# Patient Record
Sex: FEMALE | Race: WHITE | NOT HISPANIC OR LATINO | Employment: FULL TIME | URBAN - METROPOLITAN AREA
[De-identification: names, ages, dates, MRNs, and addresses within clinical notes are randomized per-mention and may not be internally consistent; named-entity substitution may affect disease eponyms.]

---

## 2022-03-28 ENCOUNTER — HOSPITAL ENCOUNTER (EMERGENCY)
Facility: CLINIC | Age: 27
Discharge: HOME | End: 2022-03-28
Attending: EMERGENCY MEDICINE | Admitting: EMERGENCY MEDICINE
Payer: COMMERCIAL

## 2022-03-28 VITALS
OXYGEN SATURATION: 98 % | HEART RATE: 107 BPM | TEMPERATURE: 98.4 F | RESPIRATION RATE: 16 BRPM | DIASTOLIC BLOOD PRESSURE: 86 MMHG | SYSTOLIC BLOOD PRESSURE: 140 MMHG

## 2022-03-28 DIAGNOSIS — F39 MOOD DISORDER (H): ICD-10-CM

## 2022-03-28 PROCEDURE — 99285 EMERGENCY DEPT VISIT HI MDM: CPT | Mod: 25 | Performed by: EMERGENCY MEDICINE

## 2022-03-28 PROCEDURE — 99283 EMERGENCY DEPT VISIT LOW MDM: CPT | Performed by: EMERGENCY MEDICINE

## 2022-03-28 PROCEDURE — 90791 PSYCH DIAGNOSTIC EVALUATION: CPT

## 2022-03-28 RX ORDER — FLUOXETINE 40 MG/1
40 CAPSULE ORAL DAILY
COMMUNITY

## 2022-03-28 NOTE — ED TRIAGE NOTES
"Pt attempting SIB about 2000 on L wrist last evening. \"I felt numb\". Several small lacerations   "

## 2022-03-28 NOTE — DISCHARGE INSTRUCTIONS
Appointment information and/or additional resources available to me:  You were given information on numerous medication management providers within your local area and insurance network. Please reach out to them if you feel you need specialized care for your psychotropic medication. You were also given information on Gene Sight testing in the information below.    Aftercare Plan    If I am feeling unsafe or I am in a crisis, I will:   Contact my established care providers   Call the National Suicide Prevention Lifeline: 921.372.4518   Go to the nearest emergency room   Call [phone]     Warning signs that I or other people might notice when a crisis is developing for me: any thoughts to harm myself or others    Things I am able to do on my own to cope or help me feel better: reading     Changes I can make to support my mental health and wellness: please speak with your primary care provider about any required medication changes    People in my life that I can ask for help: john Schneider, primary care provider, and therapist    [facility] has a mental health crisis team you can call 24/7: [facility] Mobile Crisis  [phone]    Other things that are important when I m in crisis: please contact your primary care provider or therapist via phone, or the crisis phone numbers below.    What is the GeneSight test?    GeneSight Psychotropic is a pharmacogenomic test which means that it analyzes how your genes may affect medication outcomes. The GeneSight test analyzes clinically important genetic variations in your DNA. Results can inform your doctor about how you may break down or respond to certain medications commonly prescribed to treat depression, anxiety, ADHD, and other psychiatric conditions.    The GeneSight test must be ordered by your doctor or nurse practitioner. The test is a simple cheek swab taken in your healthcare provider s office or can be sent by your doctor to be taken in the convenience of your home.      Things I can use or do for distraction: Reduce Extreme Emotion  QUICKLY:  Changing Your Body Chemistry      T:  Change your body Temperature to change your autonomic nervous system   ? Use Ice Water to calm yourself down FAST   ? Put your face in a bowl of ice water (this is the best way; have the person keep his/her face in ice water for 30-45 seconds - initial research is showing that the longer s/he can hold her/his face in the water, the better the response), or   ? Splash ice water on your face, or hold an ice pack on your face      I:  Intensely exercise to calm down a body revved up by emotion   ? Examples: running, walking fast, jumping, playing basketball, weight lifting, swimming, calisthenics, etc.   ? Engage in exercises that DO NOT include violent behaviors. Exercises that utilize violent behaviors tend to function as  behavioral rehearsal,  and rather than calming the person down, may actually  rev  the person up more, increasing the likelihood of violence, and lessening the likelihood that they will  burn off  energy     P:  Progressively relax your muscles   ? Starting with your hands, moving to your forearms, upper arms, shoulders, neck, forehead, eyes, cheeks and lips, tongue and teeth, chest, upper back, stomach, buttocks, thighs, calves, ankles, feet   ? Tense (10 seconds,   of the way), then relax each muscle (all the way)   ? Notice the tension   ? Notice the difference when relaxed (by tensing first, and then relaxing, you are able to get a more thorough relaxation than by simply relaxing)     P: Paced breathing to relax   ? The standard technique is to begin with counting the number of steps one takes for a typical inhale, then counting the steps one takes for a typical exhale, and then lengthening the amount of steps for the exhalation by one or two steps.  OR  ? Repeat this pattern for 1-2 minutes  ? Inhale for four (4) seconds   ? Exhale for six (6) to eight (8) seconds   ? Research  "demonstrated that one can change one's overall level of anxiety by doing this exercise for even a few minutes per day       Crisis Lines  Crisis Text Line  Text 526653  You will be connected with a trained live crisis counselor to provide support.    Max vale, binduo  MARY a 507853 o texto a 442-AYUDAME en WhatsApp    National Hope Line  1.800.SUICIDE [2608756]      Community Resources  Fast Tracker  Linking people to mental health and substance use disorder resources  [employer] [place]usra     [place] [facility] Warm Line  Peer to peer support  Monday thru Saturday, 12 pm to 10 pm  860.563.6258 or 4.735.867.2116  Text \"Support\" to 27188    National [place] [employer]ness (KATHI)  609.381.1199 or 1.888.KATHI.HELPS        Mental Health Apps  My3  https://[employer].org/    VirtualHopeBox  https://[employer]/apps/virtual-hope-box/      Additional Information  Today you were seen by a licensed mental health professional through Triage and Transition services, [facility] ([facility])  for a crisis assessment in the Emergency Department at [facility].  It is recommended that you follow up with your established providers (psychiatrist, mental health therapist, and/or primary care doctor - as relevant) as soon as possible. Coordinators from [facility] will be calling you in the next 24-48 hours to ensure that you have the resources you need.  You can also contact [facility] coordinators directly at [phone]. You may have been scheduled for or offered an appointment with a mental health provider. [facility] maintains an extensive network of licensed behavioral health providers to connect patients with the services they need.  We do not charge providers a fee to participate in our referral network.  We match patients with providers based on a patient's specific needs, insurance coverage, and location.  Our first effort will be to refer you to a provider within your care system, and will utilize " providers outside your care system as needed.

## 2022-03-28 NOTE — ED NOTES
"3/28/2022  Ericka Thomson 1995     [facility] Crisis Assessment    Patient was assessed: in person  Patient location: [facility] Adult ED    Referral Data and Chief Complaint  Patient is a 26 year old female who presents with her fiance Wyatt for the following concerns: self-injury.      Informed Consent and Assessment Methods    Patient is her own guardian. Writer met with patient and explained the crisis assessment process, including applicable information disclosures and limits to confidentiality, assessed understanding of the process, and obtained consent to proceed with the assessment. Patient was observed to be able to participate in the assessment as evidenced by verbal consent. Assessment methods included conducting a formal interview with patient, review of medical records, collaboration with medical staff, and obtaining relevant collateral information from family and community providers when available.    Narrative Summary of Presenting Problem and Current Functioning  What led to the patient presenting for crisis services, factors that make the crisis life threatening or complex, stressors, how is this disrupting the patient's life, and how current functioning is in comparison to baseline. How is patient presenting during the assessment.     Patient reports not feeling good last night after a \"stupid argument\" with her fiancé that \"spiraled\".  Patient reports she has not felt like her normal self for the past week and a half, since her Prozac was doubled from 20 mg to 40 mg.  Patient reports she now feels \"numb and out of it\".  Patient denies feeling angry, sad, or anything at all.  Patient denies any thoughts to kill herself, but reports she wanted to feel something following this argument last night.  Patient reports she did not draw blood, but attempted to cut her wrist.  Patient reports her fiancé found her in the bathroom and took away her sharps.  Patient reports the pair both took the day off " "today, patient reached out to her primary care clinic in case.  Patient's primary care clinic recommended further evaluation in our emergency department.    History of the Crisis  Duration of the current crisis, coping skills attempted to reduce the crisis, community resources used, and past presentations.    Patient has no history of higher level mental health care, neither inpatient nor intensive outpatient.  Patient has no history of chemical dependency treatment.  Patient has an established individual therapist whom she sees once per month.  Patient reports she had to cancel last appointment due to scheduling conflict.  Patient attempted to schedule another appointment this morning, reports the scheduling website was down.  Patient has a primary care provider who prescribes her psychotropic medication.  Patient was started on Prozac approximately 1.5 months ago.  Patient's dosage was doubled approximately 1.5 weeks ago, from 20 mg to 40 mg currently.    Collateral Information  Per patient's fiancé Wyatt was present in our emergency department: Patient and her fiancé had a \"senseless\" argument yesterday, which is uncommon for them.  Patient's fiancé reports finding 1 of patient's coworkers \"bothersome\".  Patient's fiancé reports this is a \"jealous thing\".  Patient then worries her fiancé will leave her.  Patient's fiancé tried to calm her, de-escalate the situation.  Patient continued crying heavily, was found in their shared bathroom expressing thoughts to cut herself.  Patient and her fiancé both took the day off today and patient contacted her primary care doctor who recommended further evaluation in our emergency department.  Patient's fiancé reports he spent the whole day with her, felt she was safe prior to arrival as well as currently.  Patient's fiancé reports concern with her high medication dosage which helps with \"parts of things, but not everything\".    Per electronic medical record from family " medicine clinic RN on today, 03/28/2022 at 2:19 PM: Pt reports that she tried cutting her wrist with a knife last night after an escalated argument; she did not break the skin or draw blood. She became afraid as she cut. This was the worst her mental health has ever been. She talked it through with her fiance which helped, he is a good support to her.     She is feeling better today, no plans for self-harm today. She feels safe. Last visit with PCP to discuss her depression on 3/11/2022. Taking fluoxetine 40 mg capsule. Pt is speaking in full sentences and is logical in thoughts and responses, no audible distress.    Advised pt to report now to [facility] ED, reasons why discussed. Advised if she feels unsafe en route, she should pull over and call 911. Pt in agreement, and she feels safe to drive now. Problem list reviewed as related to this call.       Risk Assessment    Risk of Harm to Self     ESS-6  1.a. Over the past 2 weeks, have you had thoughts of killing yourself? No  1.b. Have you ever attempted to kill yourself and, if yes, when did this last happen? No   2. Recent or current suicide plan? No   3. Recent or current intent to act on ideation? No  4. Lifetime psychiatric hospitalization? No  5. Pattern of excessive substance use? No  6. Current irritability, agitation, or aggression? No  Scoring note: BOTH 1a and 1b must be yes for it to score 1 point, if both are not yes it is zero. All others are 1 point per number. If all questions 1a/1b - 6 are no, risk is negligible. If one of 1a/1b is yes, then risk is mild. If either question 2 or 3, but not both, is yes, then risk is automatically moderate regardless of total score. If both 2 and 3 are yes, risk is automatically high regardless of total score.     Score: 0, negligible risk    The patient has the following risk factors for suicide: depressive symptoms    Is the patient experiencing current suicidal ideation: No    Is the patient engaging in  preparatory suicide behaviors (formulating how to act on plan, giving away possessions, saying goodbye, displaying dramatic behavior changes, etc)? No    Does the patient have access to firearms or other lethal means? No    The patient has the following protective factors: established relationship community mental health provider(s), sense of obligation to people/pets, safe/stable housing, engagement in school and fulfilling employment    Support system information: fiance, therapist, and primary care provider    Does the patient engage in non-suicidal self-injurious behavior (NSSI/SIB)? Patient attempted minor self-injury last night around 8pm on her left wrist, has several small and superficial markings. Patient reports doing so because she felt numb.    Is the patient vulnerable to sexual exploitation?  No    Is the patient experiencing abuse or neglect? No    Is the patient a vulnerable adult? No      Risk of Harm to Others  The patient has the following risk factors of harm to others: no risk factors identified    Does the patient have thoughts of harming others? No    Is the patient engaging in sexually inappropriate behavior?  No      Current Substance Abuse    Is there recent substance abuse? No    Was a urine drug screen or blood alcohol level obtained: No       Current Symptoms/Concerns    Symptoms  Attention, hyperactivity, and impulsivity symptoms present: No    Anxiety symptoms present: Yes: Generalized Symptoms: Cognitive anxiety - feelings of doom, racing thoughts, difficulty concentrating  and Excessive worry      Appetite symptoms present: No     Behavioral difficulties present: Yes: Apathy     Cognitive impairment symptoms present: Yes: Decision-Making and Judgment/Insight    Depressive symptoms present: Yes Crying or feels like crying, Depressed mood and Feelings of helplessness      Eating disorder symptoms present: No    Learning disabilities, cognitive challenges, and/or developmental disorder  symptoms present: No     Manic/hypomanic symptoms present: No    Personality and interpersonal functioning difficulties present : No    Psychosis symptoms present: No      Sleep difficulties present: Yes: Difficulty falling asleep  and Difficulty staying sleep     Substance abuse disorder symptoms present: No     Trauma and stressor related symptoms present: No       Mental Status Exam   Affect: Appropriate   Appearance: Appropriate    Attention Span/Concentration: Attentive?    Eye Contact: Engaged   Fund of Knowledge: Appropriate    Language /Speech Content: Fluent   Language /Speech Volume: Normal    Language /Speech Rate/Productions: Normal    Recent Memory: Intact   Remote Memory: Intact   Mood: Anxious and Depressed    Orientation to Person: Yes    Orientation to Place: Yes   Orientation to Time of Day: Yes    Orientation to Date: Yes    Situation (Do they understand why they are here?): Yes    Psychomotor Behavior: Normal    Thought Content: Clear   Thought Form: Intact       Mental Health and Substance Abuse History    History  Current and historical diagnoses or mental health concerns: Patient carries major depression diagnosis.    Prior MH services (inpatient, programmatic care, outpatient, etc) : Yes history of individual therapy and medication management.    Has the patient used [facility] crisis team services before?: No    History of substance abuse: No    Prior TRAVIS services (inpatient, programmatic care, detox, outpatient, etc) : No    History of commitment: No    Family history of MH/TRAVIS: None reported.    Trauma history: None reported.    Medication  Psychotropic medications:   No current facility-administered medications for this encounter.     Current Outpatient Medications   Medication     FLUoxetine (PROZAC) 40 MG capsule       Current Care Team  Primary Care Provider: Dr. Mk [facility] [address] [place] [address] [phone]      Therapist: Taylor RODRIGUEZ  Chari, PhD, LP [facility] [address], [place] [address] [phone]    Psychiatrist: No    : No    CTSS or ARMHS: No    ACT Team: No    Other: No    Release of Information  Was a release of information signed: Yes. Providers included on the release: providres listed above.      Biopsychosocial Information    Socioeconomic Information  Current living situation: Patient lives in an apartment with her fiance and dog.    Employment/income source: Patient is a 9th grade . Patient has her masters in education from [employer], with hope to return for her masters in fine arts. Patient has goal of becoming a .    Relevant legal issues: No    Cultural, Moravian, or spiritual influences on mental health care: No    Is the patient active in the  or a : No      Relevant Medical Concerns   Patient identifies concerns with completing ADLs? No     Patient can ambulate independently? Yes     Other medical concerns? No     History of concussion or TBI? No        Diagnosis      1 Major depressive disorder, Recurrent episode, Unspecified F33.9 (by history)          Therapeutic Intervention  The following therapeutic methodologies were employed when working with the patient: establishing rapport, active listening, assessing dimensions of crisis, solution focused brief therapy, identifying additional supports and alternative coping skills, establishing a discharge plan, safety planning, psychoeducation, motivational interviewing, brief supportive therapy, trauma informed care, DBT skills, treatment planning and harm reduction. Patient response to intervention: engaged fully.      Disposition  Recommended disposition: Individual Therapy and Medication Management      Reviewed case and recommendations with attending provider. Attending Name: Dr. Oscar Villagran      Attending concurs with disposition: Yes      Patient concurs with disposition: Yes       Final disposition: Patient to continue with established therapist and primary care provider for medication. Patient was given list of medication management providers should she want specialized care. Patient was also given information on GeneSight testing should she choose to pursue this.    Clinical Substantiation of Recommendations   Rationale with supporting factors for disposition and diagnosis.     Patient cites primary stressor(s) as verbal conflict with fiance yesterday. Patient endorses self-injurious behavior including scratching and thoughts to cut. Patient did not actually draw blood, and did not require any medical attention for her superficial markings. Patient  denies suicidal ideation, intent, and plan. Patient  denies homicidal ideation, intent, and plan. Patient  denies symptoms of psychosis. Patient does not appear to be responding to internal stimuli. Patient denies substance use.         Assessment Details  Patient interview started at: 5:30pm and completed at: 6:00pm.    Total duration spent on the patient case in minutes: .50 hrs     CPT code(s) utilized: 39542 - Psychotherapy for Crisis - 60 (30-74*) min       Aftercare and Safety Planning  Follow up plans with MH/TRAVIS services: Patient to follow up with established primary care and therapist. Patient declined scheduling medication management provider, but was accepting of resources should she choose to pursue specialized psychiatric care in the future.     Aftercare plan placed in the AVS and provided to patient: Yes. Given to patient by nursing staff.    CHASE Vogt      Appointment information and/or additional resources available to me:  You were given information on numerous medication management providers within your local area and insurance network. Please reach out to them if you feel you need specialized care for your psychotropic medication. You were also given information on Gene Sight testing in the information  below.  Aftercare Plan    If I am feeling unsafe or I am in a crisis, I will:   Contact my established care providers   Call the National Suicide Prevention Lifeline: 900.664.7242   Go to the nearest emergency room   Call 911     Warning signs that I or other people might notice when a crisis is developing for me: any thoughts to harm myself or others    Things I am able to do on my own to cope or help me feel better: reading     Changes I can make to support my mental health and wellness: please speak with your primary care provider about any required medication changes    People in my life that I can ask for help: john Schneider, primary care provider, and therapist    [facility] has a mental health crisis team you can call 24/7: [facility] Mobile Crisis  [phone]    Other things that are important when I m in crisis: please contact your primary care provider or therapist via phone, or the crisis phone numbers below.    What is the GeneSight test?    GeneSight Psychotropic is a pharmacogenomic test which means that it analyzes how your genes may affect medication outcomes. The GeneSight test analyzes clinically important genetic variations in your DNA. Results can inform your doctor about how you may break down or respond to certain medications commonly prescribed to treat depression, anxiety, ADHD, and other psychiatric conditions.    The GeneSight test must be ordered by your doctor or nurse practitioner. The test is a simple cheek swab taken in your healthcare provider s office or can be sent by your doctor to be taken in the convenience of your home.     Things I can use or do for distraction: Reduce Extreme Emotion  QUICKLY:  Changing Your Body Chemistry      T:  Change your body Temperature to change your autonomic nervous system   ? Use Ice Water to calm yourself down FAST   ? Put your face in a bowl of ice water (this is the best way; have the person keep his/her face in ice water for 30-45 seconds -  initial research is showing that the longer s/he can hold her/his face in the water, the better the response), or   ? Splash ice water on your face, or hold an ice pack on your face      I:  Intensely exercise to calm down a body revved up by emotion   ? Examples: running, walking fast, jumping, playing basketball, weight lifting, swimming, calisthenics, etc.   ? Engage in exercises that DO NOT include violent behaviors. Exercises that utilize violent behaviors tend to function as  behavioral rehearsal,  and rather than calming the person down, may actually  rev  the person up more, increasing the likelihood of violence, and lessening the likelihood that they will  burn off  energy     P:  Progressively relax your muscles   ? Starting with your hands, moving to your forearms, upper arms, shoulders, neck, forehead, eyes, cheeks and lips, tongue and teeth, chest, upper back, stomach, buttocks, thighs, calves, ankles, feet   ? Tense (10 seconds,   of the way), then relax each muscle (all the way)   ? Notice the tension   ? Notice the difference when relaxed (by tensing first, and then relaxing, you are able to get a more thorough relaxation than by simply relaxing)     P: Paced breathing to relax   ? The standard technique is to begin with counting the number of steps one takes for a typical inhale, then counting the steps one takes for a typical exhale, and then lengthening the amount of steps for the exhalation by one or two steps.  OR  ? Repeat this pattern for 1-2 minutes  ? Inhale for four (4) seconds   ? Exhale for six (6) to eight (8) seconds   ? Research demonstrated that one can change one's overall level of anxiety by doing this exercise for even a few minutes per day       Crisis Lines  Crisis Text Line  Text 491948  You will be connected with a trained live crisis counselor to provide support.    Por espanol, texto  MARY a 616365 o texto a 442-AYUDAME en [facility] Line  1.800.SUICIDE  "[1577118]      Community Resources  Fast Tracker  Linking people to mental health and substance use disorder resources  InstagramckBragThis.comn.org     [place] Mental Health Warm Line  Peer to peer support  Monday thru Saturday, 12 pm to 10 pm  022.088.8711 or 1.015.067.8213  Text \"Support\" to 75831    National [place] on Mental Illness (KATHI)  606.543.0652 or 1.888.KATHI.HELPS        Mental Health Apps  My3  https://Qwilr.org/    VirtualHopeBox  https://[employer]/apps/virtual-hope-box/      Additional Information  Today you were seen by a licensed mental health professional through Triage and Transition services, [facility] (P)  for a crisis assessment in the Emergency Department at [facility].  It is recommended that you follow up with your established providers (psychiatrist, mental health therapist, and/or primary care doctor - as relevant) as soon as possible. Coordinators from [facility] will be calling you in the next 24-48 hours to ensure that you have the resources you need.  You can also contact [facility] coordinators directly at [phone]. You may have been scheduled for or offered an appointment with a mental health provider. [facility] maintains an extensive network of licensed behavioral health providers to connect patients with the services they need.  We do not charge providers a fee to participate in our referral network.  We match patients with providers based on a patient's specific needs, insurance coverage, and location.  Our first effort will be to refer you to a provider within your care system, and will utilize providers outside your care system as needed.    "

## 2022-03-28 NOTE — ED PROVIDER NOTES
[facility] EMERGENCY DEPARTMENT ([facility])       3/28/22  History     Chief Complaint   Patient presents with     Self Injury     HPI  Ericka Thomson is a 26 year old female with a past medical history significant for depression who presents to the Emergency Department for evaluation of self injurious behavior.    Patient reports that she has been prescribed Prozac 40 mg for a while and endorses that she does not believe that this medication is right for her.  She states that yesterday she attempted harming herself on her left forearm in the form of cutting.  She says that she has several small cuts on her left forearm but denies any deep cuts.  She adds that yesterday was the first time in the last 2 months that she had self injures thoughts.  She notes that she has past history of cutting herself.  Patient was in an argument last evening with fiancé that triggered the event. Patient denies suicide attempts in the past.  Patient denies history of admission to the [facility] for mental health.  Patient reports that she is currently on birth control.  Patient's last tetanus was 09/25/2018.    No past medical history on file.    No past surgical history on file.    No family history on file.    Social History     Tobacco Use     Smoking status: Not on file     Smokeless tobacco: Not on file   Substance Use Topics     Alcohol use: Not on file       No current facility-administered medications for this encounter.     Current Outpatient Medications   Medication     FLUoxetine (PROZAC) 40 MG capsule      No Known Allergies     I have reviewed the Medications, Allergies, Past Medical and Surgical History, and Social History in the Epic system.    Review of Systems  A complete review of systems was performed with pertinent positives and negatives noted in the HPI, and all other systems negative.    Physical Exam   BP: (!) 140/86  Pulse: 107  Temp: 98.4  F (36.9  C)  Resp: 16  SpO2: 98 %      Physical  Exam  Constitutional:       General: She is not in acute distress.     Appearance: Normal appearance.   HENT:      Head: Normocephalic and atraumatic.      Nose: Nose normal.      Mouth/Throat:      Mouth: Mucous membranes are moist.   Eyes:      Extraocular Movements: Extraocular movements intact.      Pupils: Pupils are equal, round, and reactive to light.   Cardiovascular:      Rate and Rhythm: Normal rate and regular rhythm.      Pulses: Normal pulses.   Pulmonary:      Effort: Pulmonary effort is normal.      Breath sounds: Normal breath sounds.   Abdominal:      General: Abdomen is flat.      Palpations: Abdomen is soft.      Tenderness: There is no abdominal tenderness. There is no guarding or rebound.   Musculoskeletal:      Cervical back: Normal range of motion.      Comments: Superficial abrasions to the left forearm, no break in skin    Full active range of motion pulses capillary fill intact   Neurological:      General: No focal deficit present.      Mental Status: She is alert and oriented to person, place, and time.   Psychiatric:      Comments: No active suicidal or homicidal ideation         ED Course     At 3:49 PM the patient was seen and examined by Oscar Villagran MD in Room EDHW01.        Procedures              No results found for this or any previous visit (from the past 24 hour(s)).  Medications - No data to display          Assessments & Plan (with Medical Decision Making)   Patient nontoxic in no acute distress presents for psychiatric evaluation following superficial cutting.  She has been on antidepressant medication for a while but last evening had an argument with significant other which triggered extra stress and emotional discord.  She is not reverted back to cutting.  There is no active laceration there is no break in the skin.  She did not require any attention for this.  She is stable for psychiatric evaluation.  Following behavioral emergency assessment patient is stable for  discharge and outpatient follow-up.  She does have resources in place but will be provided others as well should she extra resources for other outpatient help.  She understands signs and symptoms to return.  Patient discharged in stable condition    I have reviewed the nursing notes.    I have reviewed the findings, diagnosis, plan and need for follow up with the patient.    New Prescriptions    No medications on file       Final diagnoses:   Mood disorder (H)       IKhadijah am serving as a trained medical scribe to document services personally performed by Oscar Villagran MD, based on the provider's statements to me.      I, Oscar Villagran MD, was physically present and have reviewed and verified the accuracy of this note documented by Khadijah Perkins.     Oscar Villagran MD  3/28/2022   [facility] EMERGENCY DEPARTMENT     Oscar Villagran MD  03/28/22 [id number]

## 2022-04-27 ENCOUNTER — HOSPITAL ENCOUNTER (EMERGENCY)
Facility: CLINIC | Age: 27
Discharge: HOME | End: 2022-04-27
Attending: EMERGENCY MEDICINE | Admitting: EMERGENCY MEDICINE
Payer: COMMERCIAL

## 2022-04-27 VITALS
RESPIRATION RATE: 18 BRPM | SYSTOLIC BLOOD PRESSURE: 117 MMHG | DIASTOLIC BLOOD PRESSURE: 71 MMHG | HEART RATE: 98 BPM | TEMPERATURE: 97.7 F | HEIGHT: 64 IN | WEIGHT: 210 LBS | OXYGEN SATURATION: 99 % | BODY MASS INDEX: 35.85 KG/M2

## 2022-04-27 DIAGNOSIS — R19.7 VOMITING AND DIARRHEA: ICD-10-CM

## 2022-04-27 DIAGNOSIS — R11.10 VOMITING AND DIARRHEA: ICD-10-CM

## 2022-04-27 LAB
ANION GAP SERPL CALCULATED.3IONS-SCNC: 8 MMOL/L (ref 3–14)
BASOPHILS # BLD AUTO: 0 10E3/UL (ref 0–0.2)
BASOPHILS NFR BLD AUTO: 0 %
BUN SERPL-MCNC: 13 MG/DL (ref 7–30)
CALCIUM SERPL-MCNC: 8.3 MG/DL (ref 8.5–10.1)
CHLORIDE BLD-SCNC: 106 MMOL/L (ref 94–109)
CO2 SERPL-SCNC: 19 MMOL/L (ref 20–32)
CREAT SERPL-MCNC: 0.68 MG/DL (ref 0.52–1.04)
EOSINOPHIL # BLD AUTO: 0 10E3/UL (ref 0–0.7)
EOSINOPHIL NFR BLD AUTO: 0 %
ERYTHROCYTE [DISTWIDTH] IN BLOOD BY AUTOMATED COUNT: 12.6 % (ref 10–15)
GFR SERPL CREATININE-BSD FRML MDRD: >90 ML/MIN/1.73M2
GLUCOSE BLD-MCNC: 127 MG/DL (ref 70–99)
HCG SERPL QL: NEGATIVE
HCT VFR BLD AUTO: 42.7 % (ref 35–47)
HGB BLD-MCNC: 14.2 G/DL (ref 11.7–15.7)
HOLD SPECIMEN: NORMAL
HOLD SPECIMEN: NORMAL
IMM GRANULOCYTES # BLD: 0 10E3/UL
IMM GRANULOCYTES NFR BLD: 0 %
LYMPHOCYTES # BLD AUTO: 0.5 10E3/UL (ref 0.8–5.3)
LYMPHOCYTES NFR BLD AUTO: 6 %
MCH RBC QN AUTO: 29.2 PG (ref 26.5–33)
MCHC RBC AUTO-ENTMCNC: 33.3 G/DL (ref 31.5–36.5)
MCV RBC AUTO: 88 FL (ref 78–100)
MONOCYTES # BLD AUTO: 0.5 10E3/UL (ref 0–1.3)
MONOCYTES NFR BLD AUTO: 5 %
NEUTROPHILS # BLD AUTO: 8.1 10E3/UL (ref 1.6–8.3)
NEUTROPHILS NFR BLD AUTO: 89 %
NRBC # BLD AUTO: 0 10E3/UL
NRBC BLD AUTO-RTO: 0 /100
PLATELET # BLD AUTO: 342 10E3/UL (ref 150–450)
POTASSIUM BLD-SCNC: 3.6 MMOL/L (ref 3.4–5.3)
RBC # BLD AUTO: 4.86 10E6/UL (ref 3.8–5.2)
SODIUM SERPL-SCNC: 133 MMOL/L (ref 133–144)
WBC # BLD AUTO: 9.2 10E3/UL (ref 4–11)

## 2022-04-27 PROCEDURE — 250N000011 HC RX IP 250 OP 636: Performed by: EMERGENCY MEDICINE

## 2022-04-27 PROCEDURE — 80048 BASIC METABOLIC PNL TOTAL CA: CPT | Performed by: EMERGENCY MEDICINE

## 2022-04-27 PROCEDURE — 85025 COMPLETE CBC W/AUTO DIFF WBC: CPT | Performed by: EMERGENCY MEDICINE

## 2022-04-27 PROCEDURE — 258N000003 HC RX IP 258 OP 636: Performed by: EMERGENCY MEDICINE

## 2022-04-27 PROCEDURE — 84703 CHORIONIC GONADOTROPIN ASSAY: CPT | Performed by: EMERGENCY MEDICINE

## 2022-04-27 PROCEDURE — 87506 IADNA-DNA/RNA PROBE TQ 6-11: CPT | Performed by: EMERGENCY MEDICINE

## 2022-04-27 PROCEDURE — 36415 COLL VENOUS BLD VENIPUNCTURE: CPT | Performed by: EMERGENCY MEDICINE

## 2022-04-27 PROCEDURE — 96374 THER/PROPH/DIAG INJ IV PUSH: CPT

## 2022-04-27 PROCEDURE — 96361 HYDRATE IV INFUSION ADD-ON: CPT

## 2022-04-27 PROCEDURE — 87493 C DIFF AMPLIFIED PROBE: CPT | Mod: 59 | Performed by: EMERGENCY MEDICINE

## 2022-04-27 PROCEDURE — 99284 EMERGENCY DEPT VISIT MOD MDM: CPT | Mod: 25

## 2022-04-27 RX ORDER — ONDANSETRON 2 MG/ML
4 INJECTION INTRAMUSCULAR; INTRAVENOUS
Status: COMPLETED | OUTPATIENT
Start: 2022-04-27 | End: 2022-04-27

## 2022-04-27 RX ORDER — SODIUM CHLORIDE 9 MG/ML
INJECTION, SOLUTION INTRAVENOUS CONTINUOUS
Status: DISCONTINUED | OUTPATIENT
Start: 2022-04-27 | End: 2022-04-28 | Stop reason: HOSPADM

## 2022-04-27 RX ORDER — ONDANSETRON 4 MG/1
4 TABLET, ORALLY DISINTEGRATING ORAL EVERY 8 HOURS PRN
Qty: 10 TABLET | Refills: 0 | Status: SHIPPED | OUTPATIENT
Start: 2022-04-27

## 2022-04-27 RX ADMIN — SODIUM CHLORIDE 1000 ML: 9 INJECTION, SOLUTION INTRAVENOUS at 20:37

## 2022-04-27 RX ADMIN — ONDANSETRON 4 MG: 2 INJECTION INTRAMUSCULAR; INTRAVENOUS at 19:52

## 2022-04-27 ASSESSMENT — ENCOUNTER SYMPTOMS
DIARRHEA: 1
SHORTNESS OF BREATH: 0
NAUSEA: 1
VOMITING: 1
ABDOMINAL PAIN: 0

## 2022-04-28 ENCOUNTER — TELEPHONE (OUTPATIENT)
Dept: EMERGENCY MEDICINE | Facility: CLINIC | Age: 27
End: 2022-04-28
Payer: COMMERCIAL

## 2022-04-28 LAB
C COLI+JEJUNI+LARI FUSA STL QL NAA+PROBE: NOT DETECTED
C DIFF TOX B STL QL: NEGATIVE
EC STX1 GENE STL QL NAA+PROBE: NOT DETECTED
EC STX2 GENE STL QL NAA+PROBE: NOT DETECTED
NOROV GI+II ORF1-ORF2 JNC STL QL NAA+PR: DETECTED
RVA NSP5 STL QL NAA+PROBE: NOT DETECTED
SALMONELLA SP RPOD STL QL NAA+PROBE: NOT DETECTED
SHIGELLA SP+EIEC IPAH STL QL NAA+PROBE: NOT DETECTED
V CHOL+PARA RFBL+TRKH+TNAA STL QL NAA+PR: NOT DETECTED
Y ENTERO RECN STL QL NAA+PROBE: NOT DETECTED

## 2022-04-28 NOTE — ED PROVIDER NOTES
"  History     Chief Complaint:    Nausea, Vomiting, & Diarrhea      HPI   Ericka Thomson is a 26 year old female who presents with vomiting and diarrhea.  The patient says she woke this morning and had loose stool and has since had numerous episodes of both vomiting and diarrhea throughout the day.  Her most recent couple of episodes of diarrhea have seemed to be blood-tinged.  No personal or family history of ulcerative colitis, Crohn's disease, or inflammatory bowel disease.  No recent antibiotics.  No travel.  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  She denies abdominal pain although at times she has some cramping when moving her bowels.  Her significant other with whom she lives is here with her and has not had any similar symptoms.  She has not eaten out at any restaurants or had any food borne illness exposure as far she knows.  No prior abdominal surgeries.    Review of Systems   Respiratory: Negative for shortness of breath.    Cardiovascular: Negative for chest pain.   Gastrointestinal: Positive for diarrhea, nausea and vomiting. Negative for abdominal pain.   All other systems reviewed and are negative.      Allergies:      No Known Allergies      Medications:      ondansetron (ZOFRAN-ODT) 4 MG ODT tab  FLUoxetine (PROZAC) 40 MG capsule        Past Medical History:        No past medical history on file.  There are no problems to display for this patient.       Past Surgical History:        No past surgical history on file.    Family History:        No family history on file.    Social History:  Works as a teacher.  No recent travel.  Presents with her significant other.    Physical Exam     Patient Vitals for the past 24 hrs:   BP Temp Temp src Pulse Resp SpO2 Height Weight   04/27/22 2130 -- -- -- -- -- 99 % -- --   04/27/22 2120 113/73 -- -- 85 -- 100 % -- --   04/27/22 1931 (!) 126/90 97.7  F (36.5  C) Oral 114 18 99 % 1.626 m (5' 4\") 95.3 kg (210 lb) "       Physical Exam  Constitutional:       General: She is not in acute distress.     Appearance: She is not diaphoretic.   HENT:      Head: Atraumatic.      Mouth/Throat:      Pharynx: No oropharyngeal exudate.   Eyes:      General: No scleral icterus.     Pupils: Pupils are equal, round, and reactive to light.   Cardiovascular:      Rate and Rhythm: Normal rate and regular rhythm.      Heart sounds: Normal heart sounds.   Pulmonary:      Effort: No respiratory distress.      Breath sounds: Normal breath sounds.   Abdominal:      General: Bowel sounds are normal.      Palpations: Abdomen is soft.      Tenderness: There is no abdominal tenderness.   Musculoskeletal:         General: No tenderness.   Skin:     General: Skin is warm.      Capillary Refill: Capillary refill takes less than 2 seconds.      Findings: No rash.   Neurological:      General: No focal deficit present.      Mental Status: She is oriented to person, place, and time.   Psychiatric:         Mood and Affect: Mood normal.         Behavior: Behavior normal.           Emergency Department Course     Laboratory:  Labs Ordered and Resulted from Time of ED Arrival to Time of ED Departure   BASIC METABOLIC PANEL - Abnormal       Result Value    Sodium 133      Potassium 3.6      Chloride 106      Carbon Dioxide (CO2) 19 (*)     Anion Gap 8      Urea Nitrogen 13      Creatinine 0.68      Calcium 8.3 (*)     Glucose 127 (*)     GFR Estimate >90     CBC WITH PLATELETS AND DIFFERENTIAL - Abnormal    WBC Count 9.2      RBC Count 4.86      Hemoglobin 14.2      Hematocrit 42.7      MCV 88      MCH 29.2      MCHC 33.3      RDW 12.6      Platelet Count 342      % Neutrophils 89      % Lymphocytes 6      % Monocytes 5      % Eosinophils 0      % Basophils 0      % Immature Granulocytes 0      NRBCs per 100 WBC 0      Absolute Neutrophils 8.1      Absolute Lymphocytes 0.5 (*)     Absolute Monocytes 0.5      Absolute Eosinophils 0.0      Absolute Basophils 0.0       Absolute Immature Granulocytes 0.0      Absolute NRBCs 0.0     HCG QUALITATIVE PREGNANCY - Normal    hCG Serum Qualitative Negative     ENTERIC BACTERIA AND VIRUS PANEL BY [facility] STOOL   CLOSTRIDIUM DIFFICILE TOXIN B     Medications   0.9% sodium chloride BOLUS (1,000 mLs Intravenous New Bag 4/27/22 2037)     Followed by   0.9% sodium chloride BOLUS (has no administration in time range)     Followed by   sodium chloride 0.9% infusion (has no administration in time range)   ondansetron (ZOFRAN) injection 4 mg (4 mg Intravenous Given 4/27/22 1952)       Disposition:  The patient was discharged to home.     Impression & Plan      Medical Decision Making:  This patient is a 26-year-old woman who presents to the emergency department with vomiting and diarrhea.  She is hemodynamically stable.  Bicarb slightly low reflecting some degree of dehydration.  The patient was hydrated with IV fluids and given Zofran.  Her symptoms have improved and she is now tolerating oral fluids without difficulty.  No further vomiting.  She is appropriate for outpatient management.  She will be prescribed Zofran for home use.  We discussed return precautions.  Stool studies were sent and are pending at this point.      Diagnosis:    ICD-10-CM    1. Vomiting and diarrhea  R11.10     R19.7        Discharge Medications:  New Prescriptions    ONDANSETRON (ZOFRAN-ODT) 4 MG ODT TAB    Take 1 tablet (4 mg) by mouth every 8 hours as needed for nausea            William Chin MD  04/27/22 1863

## 2022-04-28 NOTE — TELEPHONE ENCOUNTER
[facility] Emergency Department/Urgent Care Lab result notification:    [facility] ED lab result protocol used  Norovirus I and II    Reason for call  Notify of lab results, assess symptoms,  review ED providers recommendations/discharge instructions (if necessary) and advise per ED lab result f/u protocol    Lab Result   Final Enteric Bacteria and Virus Panel by [facility] Stool is POSITIVE for Norovirus l and ll  Recommendations in treatment per [facility] ED Lab Result Enteric Bacteria and Virus Panel protocol.  Information table from Emergency Dept Provider visit on 4/27/22  Symptoms reported at ED visit (Chief complaint, HPI)    Nausea, Vomiting, & Diarrhea        HPI   Ericka Thomson is a 26 year old female who presents with vomiting and diarrhea.  The patient says she woke this morning and had loose stool and has since had numerous episodes of both vomiting and diarrhea throughout the day.  Her most recent couple of episodes of diarrhea have seemed to be blood-tinged.  No personal or family history of ulcerative colitis, Crohn's disease, or inflammatory bowel disease.  No recent antibiotics.  No travel.  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  She denies abdominal pain although at times she has some cramping when moving her bowels.  Her significant other with whom she lives is here with her and has not had any similar symptoms.  She has not eaten out at any restaurants or had any food borne illness exposure as far she knows.  No prior abdominal surgeries.   ED providers Impression and Plan (applicable information) This patient is a 26-year-old woman who presents to the emergency department with vomiting and diarrhea.  She is hemodynamically stable.  Bicarb slightly low reflecting some degree of dehydration.  The patient was hydrated with IV fluids and given Zofran.  Her symptoms have improved and she is now tolerating oral fluids without difficulty.  No  further vomiting.  She is appropriate for outpatient management.  She will be prescribed Zofran for home use.  We discussed return precautions.  Stool studies were sent and are pending at this point.   Miscellaneous information na     RN Assessment (Patient s current Symptoms), include time called.  [Insert Left message here if message left]  12:54PM: Left voicemail message requesting a call back to [facility] ED Lab Result RN at [phone].  RN is available every day between 9 a.m. and 5:30 p.m.      [email or web address] [place]  Emergency Dept Lab Result RN  Ph# [phone]     Copy of Lab result   Enteric Bacteria and Virus Panel by [facility] Stool  Order: [id number]   Status: Final result     Visible to patient: No (inaccessible in MyChart)    Specimen Information: Per Rectum; Stool         1 Result Note     Component Ref Range & Units 1 d ago     Campylobacter group Not Detected Not Detected     Salmonella species Not Detected Not Detected     Shigella species Not Detected Not Detected     Vibrio group Not Detected Not Detected     Rotavirus Not Detected Not Detected     Shiga toxin 1 gene Not Detected Not Detected     Shiga toxin 2 gene Not Detected Not Detected     Norovirus I and II Not Detected Detected Abnormal      Yersinia enterocolitica Not Detected Not Detected    Resulting Agency  [facility]             Narrative  Performed by: [facility]  Testing performed by multiplexed, qualitative PCR using the [facility] Enteric Pathogens Nucleic Acid Test. Results should not be used as the sole basis for diagnosis, treatment or other patient management decisions. Positive results do not rule out co-infection with other organisms that are not detected by this test and may not be the sole or definitive cause of patient illness. Negative results in the setting of clinical illness compatible with gastroenteritis may be due to infection by pathogens that are not detected by this test or non-infectious  causes such as ulcerative colitis, irritable bowel syndrome or Crohn's disease. Note: Shiga toxin producing E. coli (STEC) typically harbor one or both genes that encode for Shiga toxins 1 and 2.      Specimen Collected: 04/27/22  8:46 PM Last Resulted: 04/28/22  4:47 AM

## 2022-04-28 NOTE — RESULT ENCOUNTER NOTE
Final Clostridium Difficile toxin B PCR is NEGATIVE.    No treatment or change in treatment per [facility] ED Lab Result Clostridium difficile protocol.

## 2022-04-28 NOTE — RESULT ENCOUNTER NOTE
Final Enteric Bacteria and Virus Panel by [facility] Stool is POSITIVE for Norovirus l and ll  Recommendations in treatment per [facility] ED Lab Result Enteric Bacteria and Virus Panel protocol.

## 2022-04-28 NOTE — ED TRIAGE NOTES
Pt arrives to the ED due to nausea, vomiting and diarrhea since 0300. States inability to keep anything down. States  blood in diarrhea x 2 episodes. Abdominal cramping.      Triage Assessment     Row Name 04/27/22 1931       Triage Assessment (Adult)    Airway WDL WDL       Respiratory WDL    Respiratory WDL WDL       Skin Circulation/Temperature WDL    Skin Circulation/Temperature WDL WDL       Cardiac WDL    Cardiac WDL WDL       Peripheral/Neurovascular WDL    Peripheral Neurovascular WDL WDL       Cognitive/Neuro/Behavioral WDL    Cognitive/Neuro/Behavioral WDL WDL

## 2022-04-28 NOTE — DISCHARGE INSTRUCTIONS
Return to the ER for worsening abdominal pain, persistent vomiting despite use of Zofran, or for any new concerns.

## 2022-04-29 NOTE — TELEPHONE ENCOUNTER
[facility] Emergency Department/Urgent Care Lab result notification     Patient/parent Name  Ericka    RN Assessment (Patient s current Symptoms), include time called.  [Insert Left message here if message left]  11:17a  Relayed results to patient  She states she has nausea and fatigue as her main symptom that lingers  Diarrhea is lessened.  Reviewed signs and symptoms of when to be seen, reviewed home cares. Patient stated understanding. Had no further questions.    Lab result (if applicable):  Final Enteric Bacteria and Virus Panel by [facility] Stool is POSITIVE for Norovirus l and ll  Recommendations in treatment per [facility] ED Lab Result Enteric Bacteria and Virus Panel protocol.  RN Recommendations/Instructions per [place] ED lab result protocol  Patient notified of lab result and treatment recommendations.  Referenced the following information for Norovirus from this source RN protocols reference guide.   Please Contact your PCP clinic or return to the Emergency department if your:    Symptoms return.    Symptoms worsen or other concerning symptom's.      Nelly Rollins RN  [facility] [employer] [place]  Emergency Dept Lab Result RN  Ph# [phone]     Copy of Lab result

## 2022-08-12 ENCOUNTER — HOSPITAL ENCOUNTER (EMERGENCY)
Facility: CLINIC | Age: 27
Discharge: HOME | End: 2022-08-12
Attending: EMERGENCY MEDICINE | Admitting: EMERGENCY MEDICINE
Payer: COMMERCIAL

## 2022-08-12 VITALS
TEMPERATURE: 98.3 F | SYSTOLIC BLOOD PRESSURE: 123 MMHG | OXYGEN SATURATION: 98 % | DIASTOLIC BLOOD PRESSURE: 74 MMHG | HEART RATE: 101 BPM | RESPIRATION RATE: 18 BRPM

## 2022-08-12 DIAGNOSIS — S61.012A LACERATION OF LEFT THUMB WITHOUT FOREIGN BODY WITHOUT DAMAGE TO NAIL, INITIAL ENCOUNTER: ICD-10-CM

## 2022-08-12 PROCEDURE — 99283 EMERGENCY DEPT VISIT LOW MDM: CPT

## 2022-08-12 PROCEDURE — 12001 RPR S/N/AX/GEN/TRNK 2.5CM/<: CPT

## 2022-08-12 RX ORDER — BUPIVACAINE HYDROCHLORIDE 5 MG/ML
5 INJECTION, SOLUTION PERINEURAL ONCE
Status: DISCONTINUED | OUTPATIENT
Start: 2022-08-12 | End: 2022-08-12 | Stop reason: HOSPADM

## 2022-08-12 ASSESSMENT — ACTIVITIES OF DAILY LIVING (ADL): ADLS_ACUITY_SCORE: 33

## 2022-08-12 ASSESSMENT — ENCOUNTER SYMPTOMS: WOUND: 1

## 2022-08-12 NOTE — ED TRIAGE NOTES
Pt here with laceration to L thumb after cutting an apple PTA. CMS intact. Bleeding controlled. Unsure of TDAP.

## 2022-08-12 NOTE — ED PROVIDER NOTES
History   Chief Complaint:  Laceration       The history is provided by the patient.      Ericka Thomson is a 26 year old female, right handed, who presents with a laceration to her left thumb while chopping up an apple earlier this morning with her new knife set. Ericka notes that she believes she has had a tetanus shot within the last 10 years. Per [facility], patient's last Tdap was on 09/25/2018. She denies using daily medication.    Review of Systems   Skin: Positive for wound (laceration of left thumb).   All other systems reviewed and are negative.      Allergies:  The patient has no known allergies.     Medications:  Prozac  Zofran  Estarylla    Past Medical History:     Obesity   Depression      Past Surgical History:    Tonsillectomy  Eye surgery    Family History:    Mother: breast cancer    Social History:  The patient presents to the ED alone.  The patient presents to the ED via car.  PCP: [facility]     Physical Exam     Patient Vitals for the past 24 hrs:   BP Temp Temp src Pulse Resp SpO2   08/12/22 0838 123/74 98.3  F (36.8  C) Temporal 101 18 98 %       Physical Exam  Constitutional: Alert, attentive, GCS 15   Eyes: EOM are normal, anicteric, conjugate gaze  CV: distal extremities warm, well perfused  Chest: Non-labored breathing on RA  GI:  non tender. No distension. No guarding or rebound.    MSK: laceration distal phalanx left thumb; 2 cm transverse;  Distal cap refill/sensation intact. Flexion full.  Neurological: Alert, attentive, moving all extremities equally.   Skin: Skin is warm and dry.    Emergency Department [facility]    -Laceration Repair    Date/Time: 8/12/2022 10:37 AM  Performed by: Aldair Yeh MD  Authorized by: Aldair Yeh MD     Risks, benefits and alternatives discussed.      ANESTHESIA (see MAR for exact dosages):     Anesthesia method:  Nerve block    Block location:  Base of thumb    Block needle gauge:  27 G     Block anesthetic:  Procaine 0.5% w/o epi    Block technique:  Digital  LACERATION DETAILS     Location:  Finger    Finger location:  L thumb    Length (cm):  2    Depth (mm):  3    REPAIR TYPE:     Repair type:  Simple      EXPLORATION:     Hemostasis achieved with:  Tourniquet    Wound exploration: wound explored through full range of motion      Wound extent: areolar tissue violated      Wound extent: fascia not violated, no foreign body, no signs of injury, no nerve damage and no tendon damage      Contaminated: no      TREATMENT:     Area cleansed with:  Betadine    Amount of cleaning:  Standard    SKIN REPAIR     Repair method:  Sutures    Suture size:  4-0    Suture material:  Nylon    Number of sutures:  5    APPROXIMATION     Approximation:  Close    POST-PROCEDURE DETAILS     Dressing:  Antibiotic ointment        PROCEDURE    Patient Tolerance:  Patient tolerated the procedure well with no immediate complications         Emergency Department Course:  Reviewed:  I reviewed nursing notes, vitals, past medical history and Care Everywhere    Assessments:  0848 I obtained history and examined the patient as noted above.   09 I rechecked the patient and explained findings.   0957 I rechecked the patient.    Interventions:  Medications   Bupivacaine 0.5% plain 5 mL (has no administration in time range)     Disposition:  The patient was discharged to home.     Impression & Plan   Medical Decision Makin-year-old woman without significant past surgical history presenting for laceration of the volar aspect of her distal phalanx of the left thumb sustained when she was cutting an apple.  She is right-handed.  Tetanus is up-to-date.  She has no evidence of neurovascular compromise, she has no evidence of flexor tendon injury.  Wound was repaired as above without incident, wound care was reviewed, recommended suture removal in 10 days, she was fitted with finger brace prior to discharge    Diagnosis:    ICD-10-CM     1. Laceration of left thumb without foreign body without damage to nail, initial encounter  S61.012A        Aldair Yeh MD  [employer]  10:46 AM 08/12/22     Scribe Disclosure:  I, Augusto Rodriguez, am serving as a scribe at 8:48 AM on 8/12/2022 to document services personally performed by Aldair Yeh MD based on my observations and the provider's statements to me.        Aldair Yeh MD  08/12/22 1041